# Patient Record
Sex: MALE | Race: WHITE | NOT HISPANIC OR LATINO | URBAN - NONMETROPOLITAN AREA
[De-identification: names, ages, dates, MRNs, and addresses within clinical notes are randomized per-mention and may not be internally consistent; named-entity substitution may affect disease eponyms.]

---

## 2017-12-15 NOTE — PATIENT DISCUSSION
Continue: Dry Eye Omega Benefits (omega-3s-dha-epa-fish oil-d3): capsule: 667-250 mg-unit 2 capsule twice a day by mouth

## 2017-12-15 NOTE — PATIENT DISCUSSION
JEFF OU:  PRESCRIBE ARTIFICIAL TEARS BID - QID. DECREASE OUTDOOR EXPOSURE AND USE UV PROTECTION/ SUNGLASSES WITH OUTDOOR ACTIVITIES. RETURN FOR FOLLOW UP AS SCHEDULED.

## 2017-12-15 NOTE — PATIENT DISCUSSION
Continue: Refresh Optive (carboxymethylcellulose-glycern): drops,gel: 1-0.9% 1 drop twice a day into both eyes

## 2017-12-15 NOTE — PATIENT DISCUSSION
MODERATE KERATOCONJUCTIVITIS WITH DRY EYE, OU:  CONTINUE HIGH QUALITY ARTIFICIAL TEARS BID - TID. RECOMMEND THE DAILY INTAKE OF OMEGA-3 DHA/EPA FATTY ACIDS TO HELP RELIEVE SYMPTOMS WITH PRIMARY CARE PHYSICIANS APPROVAL. CONTINUE NIGHTLY LUBRICATING OINTMENT OR GEL AND XIIDRA BID OU. WILL CONSIDER PUNCTAL PLUGS NEXT VISIT IF NOT RESPONSIVE OR IF SYMPTOMS PERSIST. RETURN FOR FOLLOW-UP AS SCHEDULED OR SOONER IF SYMPTOMS WORSEN.

## 2017-12-15 NOTE — PATIENT DISCUSSION
Pinguecula Counseling:  I have explained to the patient at length the diagnosis of pinguecula and its pathophysiology. I recommended the patient adequately protect their eyes from excessive UV light and dry, jose conditions. The use of artificial tears in dry conditions was encouraged. Return for follow-up as scheduled.

## 2018-12-14 NOTE — PATIENT DISCUSSION
Surgery Counseling:  I have discussed the option of glasses versus cataract surgery versus following, It was explained that when vision no longer meets the patient's visual needs and a new prescription for glasses is not likely to improve the patient's visual symptoms, the option of cataract surgery is a reasonable next step. It was explained that there is no guarantee that removing the cataract will improve their visual symptoms; however, it is believed that the cataract is contributing to the patient's visual impairment and surgery may noticeably improve both the visual and functional status of the patient. After this discussion, the patient desires to proceed with cataract surgery with implantation of an intraocular lens to improve their vision for watching TV, driving,  and to reduce glare.

## 2018-12-14 NOTE — PATIENT DISCUSSION
CATARACT, OU - VISUALLY SIGNIFICANT. SCHEDULE PHACO WITH IOL OS FIRST THEN OD  IF VISUAL SYMPTOMS PERSIST. GLASSES RX GIVEN TO FILL IF DESIRES IN THE EVENT PATIENT DOES NOT PROCEED WITH SURGERY. DISCUSSED WITH PATIENT THE OPTION OF BEING LEFT NEAR SIGHTED.

## 2018-12-14 NOTE — PATIENT DISCUSSION
MODERATE DRY EYE, OU: PRESCRIBED ARTIFICIAL TEARS 2-3 X A DAY OU. CONTINUE XIIDRA BID OU. RECOMMENDS OMEGA-3 FISH OIL WITH PRIMARY CARE PHYSICIANS APPROVAL. RETURN FOR FOLLOW-UP AS SCHEDULED OR SOONER IF SYMPTOMS WORSEN.

## 2018-12-14 NOTE — PATIENT DISCUSSION
Continue: Refresh Celluvisc (carboxymethylcellulose sodium): dropperette,gel: 1% 1 drop at bedtime into both eyes

## 2018-12-28 NOTE — PATIENT DISCUSSION
New Prescription: Prolensa (bromfenac): drops: 0.07% 1 drop once a day as directed into left eye 12-

## 2018-12-28 NOTE — PATIENT DISCUSSION
REFRACTIVE ERROR - ASTIGMATISM:  PATIENT DESIRES TO HAVE THEIR REFRACTIVE ERROR SURGICALLY CORRECTED WITH THE FEMTOSECOND LASER.

## 2018-12-28 NOTE — PATIENT DISCUSSION
New Prescription: prednisolone acetate (prednisolone acetate): drops,suspension: 1% 1 drop three times a day as directed into left eye 12-

## 2018-12-28 NOTE — PATIENT DISCUSSION
New Prescription: Besivance (besifloxacin): drops,suspension: 0.6% 1 drop three times a day as directed into left eye 12-

## 2019-01-16 NOTE — PATIENT DISCUSSION
Post-Op Instructions: The patient was instructed in the proper use of post-operative eye drops: Besivance in the surgical eye 3 times per day for 7 days, then discontinue; Ilevro once per day for 4 weeks, then discontinue; Prednisolone 3 times per day for 2 weeks, then reduce to 2 times per day for 1 week, then reduce to 1 time per day for 1 week, then discontinue. Call back instructions, retinal detachment and endophthalmitis precautions given.

## 2019-01-16 NOTE — PATIENT DISCUSSION
Continue: prednisolone acetate (prednisolone acetate): drops,suspension: 1% 1 drop three times a day as directed into left eye 12-

## 2019-01-16 NOTE — PATIENT DISCUSSION
Continue: Besivance (besifloxacin): drops,suspension: 0.6% 1 drop three times a day as directed into left eye 12-

## 2019-01-30 NOTE — PATIENT DISCUSSION
Continue: Refresh Optive Víctor-3 (carboxymethyl-gly-[id number]-xd): dropperette: 0.5-1-0.5% 1 drop as needed into both eyes

## 2019-01-30 NOTE — PATIENT DISCUSSION
S/P PC IOL, OD: GOOD POST OP RESULT. STOP ANTIBIOTIC DROP IN ONE WEEK. CONTINUE OTHER DROPS AS DIRECTED UNTIL FURTHER INSTRUCTION. RETURN FOR FOLLOW-UP IN 2 WEEKS.

## 2019-02-18 NOTE — PATIENT DISCUSSION
Continue: Refresh Optive Víctor-3 (carboxymethyl-gly-tyqi40-vg): dropperette: 0.5-1-0.5% 1 drop as needed into both eyes

## 2019-08-16 ENCOUNTER — IMPORTED ENCOUNTER (OUTPATIENT)
Dept: URBAN - NONMETROPOLITAN AREA CLINIC 1 | Facility: CLINIC | Age: 69
End: 2019-08-16

## 2019-08-16 PROBLEM — H25.13: Noted: 2019-08-16

## 2019-08-16 PROBLEM — H52.4: Noted: 2019-08-16

## 2019-08-16 PROCEDURE — 92014 COMPRE OPH EXAM EST PT 1/>: CPT

## 2019-08-16 PROCEDURE — 92015 DETERMINE REFRACTIVE STATE: CPT

## 2019-08-16 NOTE — PATIENT DISCUSSION
Presbyopia OUDiscussed refractive status in detail with patient. New glasses Rx given today. Continue to monitor. Pamela OUDiscussed diagnosis with patient. Reviewed symptoms related to cataract progression. Discussed various treatment options with patient. No treatment required at this time. Continue to monitor.

## 2019-12-18 NOTE — PATIENT DISCUSSION
Continue: Refresh Optive Víctor-3 (carboxymethyl-gly-gmsj89-pv): dropperette: 0.5-1-0.5% 1 drop as needed into both eyes

## 2019-12-18 NOTE — PATIENT DISCUSSION
NICKOLAS/MEÑO RLL : PRESCRIBED WARM COMPRESSES, EYELID SCRUBS.  IF NO IMPROVEMENT IN 7 DAYS, WILL CALL IN OCLOX

## 2020-08-21 ENCOUNTER — IMPORTED ENCOUNTER (OUTPATIENT)
Dept: URBAN - NONMETROPOLITAN AREA CLINIC 1 | Facility: CLINIC | Age: 70
End: 2020-08-21

## 2020-08-21 PROCEDURE — 92015 DETERMINE REFRACTIVE STATE: CPT

## 2020-08-21 PROCEDURE — 92014 COMPRE OPH EXAM EST PT 1/>: CPT

## 2020-12-28 NOTE — PATIENT DISCUSSION
POSTERIOR CAPSULAR FIBROSIS, OU:  VISUALLY SIGNIFICANT. PATIENT MAY BE READY TO PROCEED WITH YAG CAP. PATIENT WOULD LIKE TO DISCUSS WITH HER  &amp; WILL CALL BACK TO SCHEDULE YAG CAPSULOTOMY OD FIRST THEN LATER YAG CAPSULOTOMY OS IF VISUAL SYMPTOMS PERSIST.

## 2020-12-28 NOTE — PATIENT DISCUSSION
Continue: Refresh Optive Víctor-3 (carboxymethyl-gly-tqaj81-fk): dropperette: 0.5-1-0.5% 1 drop as needed into both eyes

## 2021-03-12 NOTE — PATIENT DISCUSSION
Continue: Refresh Optive Víctor-3 (carboxymethyl-gly-apzq69-er): dropperette: 0.5-1-0.5% 1 drop as needed into both eyes

## 2021-08-27 ENCOUNTER — IMPORTED ENCOUNTER (OUTPATIENT)
Dept: URBAN - NONMETROPOLITAN AREA CLINIC 1 | Facility: CLINIC | Age: 71
End: 2021-08-27

## 2021-08-27 PROCEDURE — 92014 COMPRE OPH EXAM EST PT 1/>: CPT

## 2021-08-27 PROCEDURE — 92015 DETERMINE REFRACTIVE STATE: CPT

## 2021-08-27 NOTE — PATIENT DISCUSSION
Presbyopia OUDiscussed refractive status in detail with patient. New glasses Rx given today. Continue to monitor. Pamela OUDiscussed diagnosis with patient. Reviewed symptoms related to cataract progression. Discussed various treatment options with patient. Recommend refer to Dr. Tahir Doe for evaluation. Patient defers treatment at this time. Continue to monitor.

## 2022-02-01 NOTE — PATIENT DISCUSSION
Continue: Refresh Celluvisc (carboxymethylcellulose sodium): dropperette,gel: 1% 1 drop at bedtime into both eyes No

## 2022-02-22 NOTE — PATIENT DISCUSSION
Educated patient on findings and condition. Digitally expressed contents and removed with sterile cotton swab in office today without complications. Prescribe amoxicillin 500mg BID po x 7 days and bacitracin/neomycin/polymyxin B marizol qhs OD. Continue warm compresses BID/prn OU. Advised to call/RTC if si/sx persist or worsen. Monitor.

## 2022-02-22 NOTE — PATIENT DISCUSSION
Educated patient on findings and condition. Continue Xiidra BID OU (renewed today) and artificial tears prn OU. Prescribe warm compresses QD/prn OU. Discussed good visual hygiene including conscious blinking. Advised to call/RTC if si/sx persist or worsen. Monitor.

## 2022-04-10 ASSESSMENT — VISUAL ACUITY
OD_SC: 20/40-
OD_CC: 20/400
OD_CC: 20/200-
OS_SC: 20/20
OS_CC: 20/20-2
OD_CC: J1
OS_CC: 20/20
OD_PH: 20/100
OD_PH: 20/40-
OS_CC: J1

## 2022-04-10 ASSESSMENT — TONOMETRY
OS_IOP_MMHG: 20
OD_IOP_MMHG: 14
OS_IOP_MMHG: 13
OD_IOP_MMHG: 13
OD_IOP_MMHG: 18
OS_IOP_MMHG: 14

## 2022-09-01 NOTE — PATIENT DISCUSSION
Educated patient on findings and conditions. Prescribe warm compresses BID/prn OU and gel drop or lubricating marizol qhs OU. Continue Xiidra BID OU, artificial tears 4-6x/day and eyelid scrubs QD-BID OU. Advised to call/RTC if si/sx persist or worsen. Monitor.

## 2022-09-09 ENCOUNTER — COMPREHENSIVE EXAM (OUTPATIENT)
Dept: URBAN - NONMETROPOLITAN AREA CLINIC 1 | Facility: CLINIC | Age: 72
End: 2022-09-09

## 2022-09-09 DIAGNOSIS — H52.4: ICD-10-CM

## 2022-09-09 PROCEDURE — 92014 COMPRE OPH EXAM EST PT 1/>: CPT

## 2022-09-09 PROCEDURE — 92015 DETERMINE REFRACTIVE STATE: CPT

## 2022-09-09 ASSESSMENT — TONOMETRY
OD_IOP_MMHG: 18
OS_IOP_MMHG: 18

## 2022-09-09 ASSESSMENT — VISUAL ACUITY
OS_SC: 20/20
OD_SC: 20/400
OD_PH: 20/80

## 2023-09-14 ENCOUNTER — ESTABLISHED PATIENT (OUTPATIENT)
Dept: URBAN - NONMETROPOLITAN AREA CLINIC 1 | Facility: CLINIC | Age: 73
End: 2023-09-14

## 2023-09-14 DIAGNOSIS — H52.4: ICD-10-CM

## 2023-09-14 PROCEDURE — 92015 DETERMINE REFRACTIVE STATE: CPT

## 2023-09-14 PROCEDURE — 92014 COMPRE OPH EXAM EST PT 1/>: CPT

## 2023-09-14 ASSESSMENT — VISUAL ACUITY
OD_PH: 20/100+1
OD_SC: 20/200
OS_SC: 20/16
OU_SC: 20/16

## 2023-09-14 ASSESSMENT — TONOMETRY
OS_IOP_MMHG: 15
OD_IOP_MMHG: 14

## 2024-09-18 NOTE — PATIENT DISCUSSION
Call and schedule with [facility] for memory issues:       [facility]  [id number]  [place] [id number]    Comments: STOP while using Besivance.

## 2024-09-27 ENCOUNTER — COMPREHENSIVE EXAM (OUTPATIENT)
Dept: URBAN - NONMETROPOLITAN AREA CLINIC 1 | Facility: CLINIC | Age: 74
End: 2024-09-27

## 2024-09-27 DIAGNOSIS — H52.4: ICD-10-CM

## 2024-09-27 PROCEDURE — 92014 COMPRE OPH EXAM EST PT 1/>: CPT

## 2024-09-27 PROCEDURE — 92015 DETERMINE REFRACTIVE STATE: CPT
